# Patient Record
Sex: FEMALE | Race: WHITE | NOT HISPANIC OR LATINO | Employment: OTHER | URBAN - NONMETROPOLITAN AREA
[De-identification: names, ages, dates, MRNs, and addresses within clinical notes are randomized per-mention and may not be internally consistent; named-entity substitution may affect disease eponyms.]

---

## 2017-03-08 ENCOUNTER — OFFICE VISIT (OUTPATIENT)
Dept: CARDIOLOGY | Facility: CLINIC | Age: 42
End: 2017-03-08

## 2017-03-08 VITALS
HEART RATE: 86 BPM | HEIGHT: 65 IN | DIASTOLIC BLOOD PRESSURE: 69 MMHG | OXYGEN SATURATION: 99 % | WEIGHT: 216.6 LBS | SYSTOLIC BLOOD PRESSURE: 119 MMHG | BODY MASS INDEX: 36.09 KG/M2

## 2017-03-08 DIAGNOSIS — Z82.49 FAMILY HISTORY OF CONGESTIVE HEART FAILURE: ICD-10-CM

## 2017-03-08 DIAGNOSIS — I10 ESSENTIAL HYPERTENSION: ICD-10-CM

## 2017-03-08 DIAGNOSIS — I45.6 WPW (WOLFF-PARKINSON-WHITE SYNDROME): ICD-10-CM

## 2017-03-08 DIAGNOSIS — R00.2 PALPITATION: ICD-10-CM

## 2017-03-08 DIAGNOSIS — R07.9 CHEST PAIN, UNSPECIFIED TYPE: Primary | ICD-10-CM

## 2017-03-08 DIAGNOSIS — R61 DIAPHORESIS: ICD-10-CM

## 2017-03-08 PROCEDURE — 99204 OFFICE O/P NEW MOD 45 MIN: CPT | Performed by: INTERNAL MEDICINE

## 2017-03-08 RX ORDER — ASPIRIN 81 MG/1
81 TABLET ORAL DAILY
Qty: 30 TABLET | Refills: 5 | Status: SHIPPED | OUTPATIENT
Start: 2017-03-08

## 2017-03-08 RX ORDER — LOSARTAN POTASSIUM AND HYDROCHLOROTHIAZIDE 25; 100 MG/1; MG/1
TABLET ORAL DAILY
COMMUNITY
Start: 2017-02-07

## 2017-03-08 RX ORDER — DOXYCYCLINE HYCLATE 100 MG/1
CAPSULE ORAL
COMMUNITY
Start: 2017-02-28

## 2017-03-08 RX ORDER — NITROGLYCERIN 0.4 MG/1
TABLET SUBLINGUAL
Qty: 25 TABLET | Refills: 5 | Status: SHIPPED | OUTPATIENT
Start: 2017-03-08

## 2017-03-08 RX ORDER — ALPRAZOLAM 0.5 MG/1
TABLET ORAL AS NEEDED
COMMUNITY
Start: 2017-02-14

## 2017-03-08 RX ORDER — CITALOPRAM 40 MG/1
TABLET ORAL DAILY
COMMUNITY
Start: 2017-02-28

## 2017-03-08 RX ORDER — LAMOTRIGINE 100 MG/1
TABLET ORAL DAILY
COMMUNITY
Start: 2017-02-28

## 2017-03-08 NOTE — PROGRESS NOTES
"Subjective   Kamila Lemus is a 41 y.o. female     Chief Complaint   Patient presents with   • Abnormal ECG   • Chest Pain     mid sternal 1 month ago lasting 5-10 minutes radiated to left shoulder blade and left arm        PROBLEM LIST:     1. Chest pain   2. Palpitations   3. Temple parkinson white syndrome   3.1. H/o of ablation at [facility]   4. Hypertension   5. Fam H/o CHF  6. Diaphoresis     Specialty Problems     None            HPI:  Mario Lemus is a 41-year-old white female referred from [facility] for evaluation of chest pain.    The patient was in her usual state of health when she developed a single episode of chest pain last month.  Ms. Lemus was standing and had the sudden onset of severe retrosternal and left parasternal chest pain.  This radiated to the infrascapular area and the patient had mild discomfort radiating down her left arm.  The patient describes a pain is severe, deep ache.  There is no associated nausea, diaphoresis, or shortness of breath.  The patient was not palpitating.  The episode was self-limited and resolved within one half hour.    Since that time the patient has had no change in her baseline state.  She is very physically active and has noticed no exertional chest discomfort or increase in exertional dyspnea.  Ms. Lemus denies orthopnea, PND, or lower extremity edema.  The patient describes intermittent palpitations.  She will have a \"fluttering\" which will last for up to 15 minutes.  This is distinctly different than the tachycardia that she had prior to ablation for symptomatic WPW.  Palpitations do not cause chest pain, lightheadedness or dizziness, or shortness of breath.    Risk factors for coronary disease include treated hypertension, family history, and cigarette smoking.              CURRENT MEDICATION:    Current Outpatient Prescriptions   Medication Sig Dispense Refill   • ALPRAZolam (XANAX) 0.5 MG tablet As Needed.     • aspirin 81 MG EC tablet " Take 1 tablet by mouth Daily. 30 tablet 5   • citalopram (CeleXA) 40 MG tablet Daily.     • doxycycline (VIBRAMYCIN) 100 MG capsule      • lamoTRIgine (LaMICtal) 100 MG tablet Daily.     • losartan-hydrochlorothiazide (HYZAAR) 100-25 MG per tablet Daily.     • nitroglycerin (NITROSTAT) 0.4 MG SL tablet 1 under the tongue as needed for angina, may repeat q5mins for up three doses 25 tablet 5     No current facility-administered medications for this visit.        ALLERGIES:    Amoxicillin; Augmentin [amoxicillin-pot clavulanate]; Codeine; Penicillins; and Sulfa antibiotics    PAST MEDICAL HISTORY:    Past Medical History   Diagnosis Date   • Glaucoma    • Hyperlipidemia    • Hypertension    • Kay-Parkinson-White (WPW) syndrome        SURGICAL HISTORY:    Past Surgical History   Procedure Laterality Date   • Cardiac ablation     • Partial hysterectomy         SOCIAL HISTORY:    Social History     Social History   • Marital status:      Spouse name: N/A   • Number of children: N/A   • Years of education: N/A     Occupational History   • Not on file.     Social History Main Topics   • Smoking status: Current Every Day Smoker   • Smokeless tobacco: Not on file   • Alcohol use No   • Drug use: No   • Sexual activity: Defer     Other Topics Concern   • Not on file     Social History Narrative   • No narrative on file       FAMILY HISTORY:    Family History   Problem Relation Age of Onset   • Heart disease Mother    • Heart disease Father        Review of Systems   Constitutional: Positive for diaphoresis. Negative for chills, fatigue, fever and unexpected weight change.   HENT: Negative.    Eyes: Positive for visual disturbance.   Respiratory: Negative.  Negative for chest tightness and shortness of breath (no orthopnea or PND).    Cardiovascular: Positive for chest pain (1 epsiode 1 month ago mid sternal pain described as sharp pain, radiated into left arm, lasting 10-15 minutes constant pain, no associated  "symptoms ) and palpitations (fluttering, intermittent, frequency 1-2 times a month onset with emotional stress, no associated symptoms ). Negative for leg swelling.   Gastrointestinal: Positive for constipation. Negative for abdominal pain, blood in stool (no melena, no hematemesis, no hematochezia), diarrhea, nausea and vomiting.   Endocrine: Negative.  Negative for cold intolerance and heat intolerance.   Genitourinary: Negative.    Musculoskeletal: Negative.  Negative for myalgias.   Skin: Negative.    Allergic/Immunologic: Negative.    Neurological: Negative.  Negative for tremors, seizures, syncope, facial asymmetry, speech difficulty, weakness, light-headedness, numbness and headaches.   Hematological: Negative.    Psychiatric/Behavioral: Negative.        VISIT VITALS:    Visit Vitals   • /69 (BP Location: Left arm, Patient Position: Sitting)   • Pulse 86   • Ht 65\" (165.1 cm)   • Wt 216 lb 9.6 oz (98.2 kg)   • SpO2 99%   • BMI 36.04 kg/m2       RECENT LABS:    Objective       Physical Exam   Constitutional: She is oriented to person, place, and time. She appears well-developed and well-nourished. No distress.   HENT:   Head: Normocephalic and atraumatic.   Mouth/Throat: No oral lesions.   Mild tonsillar hypertrophy    Eyes: Conjunctivae, EOM and lids are normal. Pupils are equal, round, and reactive to light. Lids are everted and swept, no foreign bodies found.   Negative ptosis BL   Negative lid lag BL   Negative arcus senilis BL   Negative nystagmus    Neck: Normal range of motion. Neck supple. No JVD present. No thyromegaly present.   Cardiovascular: Normal rate, regular rhythm, intact distal pulses and normal pulses.   No extrasystoles are present. PMI is not displaced.  Exam reveals no gallop, no S4, no friction rub (or click ) and no decreased pulses.    No murmur heard.   No systolic murmur is present   Pulses:       Radial pulses are 2+ on the right side, and 2+ on the left side.        Dorsalis " pedis pulses are 2+ on the right side, and 2+ on the left side.        Posterior tibial pulses are 2+ on the right side, and 2+ on the left side.   Does note have a late systolic murmur    Pulmonary/Chest: Effort normal and breath sounds normal. No respiratory distress. She has no wheezes. She has no rales. She exhibits no tenderness.   Abdominal: Soft. Bowel sounds are normal. She exhibits no distension, no abdominal bruit and no mass. There is no tenderness.   Negative organomegaly      Musculoskeletal: She exhibits edema (trace edema RLE, normal capillary refill BLE).   Neurological: She is alert and oriented to person, place, and time. She has normal reflexes.   Skin: Skin is warm and dry. No rash noted. She is not diaphoretic. No erythema. No pallor.   Psychiatric: She has a normal mood and affect. Her speech is normal and behavior is normal. Judgment and thought content normal. Cognition and memory are normal.   Nursing note and vitals reviewed.      Procedures      Assessment/Plan   #1.  Chest pain atypical for angina with some features of ischemia.  This occurs in a patient at moderate risk for coronary artery disease.  Therefore, risk stratification is appropriate.  We will perform treadmill Cardiolite stress test since it is ischemia assessment.  I feel that nuclear imaging is required given the patient's gender as well as a history of ventricular preexcitation.    #2.  We will perform echocardiography.  Although the patient was told she had mitral valve prolapse, I could not elicit typical findings even putting patient through maneuvers.  We can assess the valve anatomy and physiology, look for focal wall motion abnormalities, and look for nonischemic causes of chest discomfort.    #3.  I would also like Ms. Lemus to wear an event monitor to further evaluate her dysrhythmic substrate particularly given her history of WPW and ablation.    #4.  We'll check fasting lipid profile liver enzymes and address  that risk factor accordingly.    #5.  Ms. Lemus will be given prescription for nitroglycerin and instructions in its use were given today by me.  She will also start taking baby aspirin 81 mg daily.    #6.  The patient will follow-up with Dr. Parada as instructed through his office, and in our office after testing her on a when necessary basis for any symptoms as discussed in detail today.                   Diagnosis Plan   1. Chest pain, unspecified type  Stress Test With Myocardial Perfusion One Day    Adult Transthoracic Echo Complete    Comprehensive Metabolic Panel    Lipid Panel    Comprehensive Metabolic Panel    Lipid Panel    Adult Transthoracic Echo Complete    Stress Test With Myocardial Perfusion One Day   2. Family history of congestive heart failure  Stress Test With Myocardial Perfusion One Day    Adult Transthoracic Echo Complete    Comprehensive Metabolic Panel    Lipid Panel    Comprehensive Metabolic Panel    Lipid Panel    Adult Transthoracic Echo Complete    Stress Test With Myocardial Perfusion One Day   3. Essential hypertension  Stress Test With Myocardial Perfusion One Day    Adult Transthoracic Echo Complete    Comprehensive Metabolic Panel    Lipid Panel    Comprehensive Metabolic Panel    Lipid Panel    Adult Transthoracic Echo Complete    Stress Test With Myocardial Perfusion One Day   4. Palpitation  Stress Test With Myocardial Perfusion One Day    Adult Transthoracic Echo Complete    Cardiac Event Monitor    Comprehensive Metabolic Panel    Lipid Panel    Comprehensive Metabolic Panel    Lipid Panel    Cardiac Event Monitor    Adult Transthoracic Echo Complete    Stress Test With Myocardial Perfusion One Day   5. WPW (Kay-Parkinson-White syndrome)  Stress Test With Myocardial Perfusion One Day    Adult Transthoracic Echo Complete    Cardiac Event Monitor    Comprehensive Metabolic Panel    Lipid Panel    Comprehensive Metabolic Panel    Lipid Panel    Cardiac Event Monitor     Adult Transthoracic Echo Complete    Stress Test With Myocardial Perfusion One Day   6. Diaphoresis         Return for f/u after stress, echo, event .         Charles Ray MD

## 2017-03-28 ENCOUNTER — OUTSIDE FACILITY SERVICE (OUTPATIENT)
Dept: CARDIOLOGY | Facility: CLINIC | Age: 42
End: 2017-03-28

## 2017-03-28 PROCEDURE — 93272 ECG/REVIEW INTERPRET ONLY: CPT | Performed by: INTERNAL MEDICINE

## 2017-03-30 ENCOUNTER — APPOINTMENT (OUTPATIENT)
Dept: CARDIOLOGY | Facility: HOSPITAL | Age: 42
End: 2017-03-30
Attending: INTERNAL MEDICINE

## 2017-04-19 ENCOUNTER — APPOINTMENT (OUTPATIENT)
Dept: CARDIOLOGY | Facility: HOSPITAL | Age: 42
End: 2017-04-19
Attending: INTERNAL MEDICINE

## 2017-04-26 ENCOUNTER — APPOINTMENT (OUTPATIENT)
Dept: WOMENS IMAGING | Facility: HOSPITAL | Age: 42
End: 2017-04-26

## 2017-04-26 PROCEDURE — 77067 SCR MAMMO BI INCL CAD: CPT | Performed by: RADIOLOGY

## 2017-04-26 PROCEDURE — 77063 BREAST TOMOSYNTHESIS BI: CPT | Performed by: RADIOLOGY
